# Patient Record
Sex: FEMALE | Race: WHITE | NOT HISPANIC OR LATINO | Employment: FULL TIME | URBAN - METROPOLITAN AREA
[De-identification: names, ages, dates, MRNs, and addresses within clinical notes are randomized per-mention and may not be internally consistent; named-entity substitution may affect disease eponyms.]

---

## 2018-05-17 ENCOUNTER — HISTORICAL (OUTPATIENT)
Dept: LAB | Facility: HOSPITAL | Age: 28
End: 2018-05-17

## 2018-05-17 LAB
APPEARANCE, UA: ABNORMAL
BACTERIA SPEC CULT: ABNORMAL /HPF
BILIRUB UR QL STRIP: NEGATIVE
COLOR UR: YELLOW
GLUCOSE (UA): NEGATIVE
HGB UR QL STRIP: ABNORMAL
KETONES UR QL STRIP: NEGATIVE
LEUKOCYTE ESTERASE UR QL STRIP: ABNORMAL
NITRITE UR QL STRIP: NEGATIVE
PH UR STRIP: 6.5 [PH] (ref 5–9)
PROT UR QL STRIP: ABNORMAL
RBC #/AREA URNS HPF: 6 /HPF (ref 0–2)
SP GR UR STRIP: 1.01 (ref 1–1.03)
SQUAMOUS EPITHELIAL, UA: >36 /HPF (ref 0–4)
UROBILINOGEN UR STRIP-ACNC: 0.2
WBC #/AREA URNS HPF: 96 /HPF (ref 0–3)

## 2018-05-19 LAB — FINAL CULTURE: NORMAL

## 2022-04-10 ENCOUNTER — HISTORICAL (OUTPATIENT)
Dept: ADMINISTRATIVE | Facility: HOSPITAL | Age: 32
End: 2022-04-10
Payer: COMMERCIAL

## 2022-04-25 VITALS
OXYGEN SATURATION: 98 % | HEIGHT: 62 IN | BODY MASS INDEX: 44.63 KG/M2 | DIASTOLIC BLOOD PRESSURE: 93 MMHG | SYSTOLIC BLOOD PRESSURE: 145 MMHG | WEIGHT: 242.5 LBS

## 2022-05-20 ENCOUNTER — OFFICE VISIT (OUTPATIENT)
Dept: GYNECOLOGY | Facility: CLINIC | Age: 32
End: 2022-05-20
Payer: COMMERCIAL

## 2022-05-20 VITALS
HEART RATE: 98 BPM | DIASTOLIC BLOOD PRESSURE: 84 MMHG | TEMPERATURE: 98 F | SYSTOLIC BLOOD PRESSURE: 138 MMHG | BODY MASS INDEX: 44.87 KG/M2 | WEIGHT: 243.81 LBS | HEIGHT: 62 IN | RESPIRATION RATE: 18 BRPM

## 2022-05-20 DIAGNOSIS — Z01.419 ROUTINE GYNECOLOGICAL EXAMINATION: Primary | ICD-10-CM

## 2022-05-20 PROCEDURE — 99214 OFFICE O/P EST MOD 30 MIN: CPT | Mod: PBBFAC | Performed by: OBSTETRICS & GYNECOLOGY

## 2022-05-20 PROCEDURE — 87624 HPV HI-RISK TYP POOLED RSLT: CPT | Performed by: OBSTETRICS & GYNECOLOGY

## 2022-05-20 RX ORDER — OXYCODONE AND ACETAMINOPHEN 10; 325 MG/1; MG/1
1 TABLET ORAL 3 TIMES DAILY PRN
COMMUNITY
Start: 2022-04-18 | End: 2023-01-23

## 2022-05-20 RX ORDER — LURASIDONE HYDROCHLORIDE 20 MG/1
1 TABLET, FILM COATED ORAL NIGHTLY
COMMUNITY
Start: 2022-05-11 | End: 2023-01-23

## 2022-05-20 RX ORDER — FLUOXETINE HYDROCHLORIDE 40 MG/1
1 CAPSULE ORAL EVERY MORNING
COMMUNITY
Start: 2022-05-11

## 2022-05-20 RX ORDER — ASPIRIN 325 MG
1 TABLET, DELAYED RELEASE (ENTERIC COATED) ORAL
COMMUNITY
Start: 2022-03-08 | End: 2023-01-23

## 2022-05-20 RX ORDER — CLONAZEPAM 0.5 MG/1
1 TABLET ORAL 2 TIMES DAILY PRN
COMMUNITY
Start: 2022-05-11

## 2022-05-20 NOTE — PROGRESS NOTES
Subjective:       Patient ID: Va Guzman is a 32 y.o. female.    Chief Complaint:  Annual Exam      History of Present Illness  Presents for gyn wellness visit without complaints.  Periods are regular without excessive bleeding or pain. She denies abnormal bleeding, vaginal discharge, breast masses or other changes of concern.      GYN & OB History  Patient's last menstrual period was 2022.   Date of Last Pap: No result found    OB History    Para Term  AB Living   2 2 2     2   SAB IAB Ectopic Multiple Live Births                  # Outcome Date GA Lbr Abimael/2nd Weight Sex Delivery Anes PTL Lv   2 Term 17     Vag-Spont      1 Term 14     Vag-Spont          Review of Systems  Review of Systems        Objective:    Physical Exam:   Constitutional: She is oriented to person, place, and time. She appears well-developed and well-nourished.    HENT:   Head: Normocephalic.    Eyes: Pupils are equal, round, and reactive to light. EOM are normal.    Neck: No thyromegaly present.    Cardiovascular: Normal rate, regular rhythm and normal heart sounds.    No murmur heard.   Pulmonary/Chest: Effort normal and breath sounds normal. Right breast exhibits no mass, no nipple discharge, no skin change and no tenderness. Left breast exhibits no mass, no nipple discharge, no skin change and no tenderness. Breasts are symmetrical.        Abdominal: Soft. She exhibits no distension. There is no abdominal tenderness.     Genitourinary:    Vagina and uterus normal.   The external female genitalia was normal.   Labial bartholins normal.There is no lesion on the right labia. There is no lesion on the left labia. Cervix is normal. Right adnexum displays no mass and no tenderness. Left adnexum displays no mass and no tenderness. Vagina exhibits no lesion. No erythema, rectocele, cystocele or unspecified prolapse of vaginal walls in the vagina. Cervix exhibits no lesion, no discharge, no friability, no  lesion and no tenderness. Uerus contour normal  Uterus is not fixed. Normal urethral meatus.Bladder findings: no bladder tenderness          Musculoskeletal: Normal range of motion. No edema.      Lymphadenopathy:     She has no cervical adenopathy.    Neurological: She is alert and oriented to person, place, and time.    Skin: Skin is warm and dry. No rash noted.    Psychiatric: She has a normal mood and affect.          Assessment:        1. Routine gynecological examination                Plan:      F/U Pap  SBE

## 2022-05-30 LAB
HPV16+18+H RISK 12 DNA CVX-IMP: NEGATIVE
INSULIN SERPL-ACNC: NORMAL U[IU]/ML
LAB AP BETHESDA CATEGORY: NORMAL
LAB AP COMMENTS: NORMAL
LAB AP GYN MOLECULAR TESTING: NORMAL
LAB AP LMP DATE: NORMAL
LAB AP OCHS PAP SPECIMEN ADEQUACY: NORMAL
LAB AP OHS PAP INTERPRETATION: NORMAL
LAB AP PAP DISCLAIMER COMMENTS: NORMAL

## 2022-11-08 ENCOUNTER — TELEPHONE (OUTPATIENT)
Dept: PRIMARY CARE CLINIC | Facility: CLINIC | Age: 32
End: 2022-11-08
Payer: COMMERCIAL

## 2022-11-08 ENCOUNTER — PATIENT MESSAGE (OUTPATIENT)
Dept: PRIMARY CARE CLINIC | Facility: CLINIC | Age: 32
End: 2022-11-08
Payer: COMMERCIAL

## 2022-11-08 NOTE — TELEPHONE ENCOUNTER
----- Message from Yu William sent at 11/8/2022 10:09 AM CST -----  Regarding: sooner appt  .Type:  Needs Medical Advice    Who Called: pt   Symptoms (please be specific):    How long has patient had these symptoms:    Pharmacy name and phone #:    Would the patient rather a call back or a response via [facility]? Call back   Best Call Back Number: [phone]  Additional Information: pt wants a sooner appt than January 4th. She wants to discuss weight loss with Dr. Muir

## 2022-11-14 ENCOUNTER — OFFICE VISIT (OUTPATIENT)
Dept: URGENT CARE | Facility: CLINIC | Age: 32
End: 2022-11-14
Payer: COMMERCIAL

## 2022-11-14 VITALS
DIASTOLIC BLOOD PRESSURE: 93 MMHG | SYSTOLIC BLOOD PRESSURE: 139 MMHG | WEIGHT: 244 LBS | BODY MASS INDEX: 43.23 KG/M2 | HEART RATE: 96 BPM | RESPIRATION RATE: 20 BRPM | OXYGEN SATURATION: 99 % | HEIGHT: 63 IN | TEMPERATURE: 99 F

## 2022-11-14 DIAGNOSIS — J11.1 INFLUENZA: ICD-10-CM

## 2022-11-14 DIAGNOSIS — J02.0 STREP THROAT: Primary | ICD-10-CM

## 2022-11-14 DIAGNOSIS — J02.9 SORE THROAT: ICD-10-CM

## 2022-11-14 LAB
CTP QC/QA: YES
CTP QC/QA: YES
FLUAV AG NPH QL: POSITIVE
FLUBV AG NPH QL: NEGATIVE
S PYO RRNA THROAT QL PROBE: POSITIVE

## 2022-11-14 PROCEDURE — 87804 POCT INFLUENZA A/B: ICD-10-PCS | Mod: QW,,, | Performed by: FAMILY MEDICINE

## 2022-11-14 PROCEDURE — 87804 INFLUENZA ASSAY W/OPTIC: CPT | Mod: QW,,, | Performed by: FAMILY MEDICINE

## 2022-11-14 PROCEDURE — 87880 STREP A ASSAY W/OPTIC: CPT | Mod: QW,,, | Performed by: FAMILY MEDICINE

## 2022-11-14 PROCEDURE — 87880 POCT RAPID STREP A: ICD-10-PCS | Mod: QW,,, | Performed by: FAMILY MEDICINE

## 2022-11-14 PROCEDURE — 99213 OFFICE O/P EST LOW 20 MIN: CPT | Mod: 25,,, | Performed by: FAMILY MEDICINE

## 2022-11-14 PROCEDURE — 99213 PR OFFICE/OUTPT VISIT, EST, LEVL III, 20-29 MIN: ICD-10-PCS | Mod: 25,,, | Performed by: FAMILY MEDICINE

## 2022-11-14 RX ORDER — AMOXICILLIN 500 MG/1
500 CAPSULE ORAL EVERY 12 HOURS
Qty: 20 CAPSULE | Refills: 0 | Status: SHIPPED | OUTPATIENT
Start: 2022-11-14 | End: 2022-11-24

## 2022-11-14 RX ORDER — OSELTAMIVIR PHOSPHATE 75 MG/1
75 CAPSULE ORAL 2 TIMES DAILY
Qty: 10 CAPSULE | Refills: 0 | Status: SHIPPED | OUTPATIENT
Start: 2022-11-14 | End: 2022-11-19

## 2022-11-14 RX ORDER — DEXTROAMPHETAMINE SACCHARATE, AMPHETAMINE ASPARTATE, DEXTROAMPHETAMINE SULFATE AND AMPHETAMINE SULFATE 2.5; 2.5; 2.5; 2.5 MG/1; MG/1; MG/1; MG/1
1 TABLET ORAL 2 TIMES DAILY
COMMUNITY
Start: 2022-10-13

## 2022-11-14 RX ORDER — AMOXICILLIN 500 MG/1
500 CAPSULE ORAL EVERY 12 HOURS
Qty: 20 CAPSULE | Refills: 0 | Status: SHIPPED | OUTPATIENT
Start: 2022-11-14 | End: 2022-11-14

## 2022-11-14 RX ORDER — BALOXAVIR MARBOXIL 80 MG/1
80 TABLET, FILM COATED ORAL ONCE
Qty: 1 TABLET | Refills: 0 | Status: SHIPPED | OUTPATIENT
Start: 2022-11-14 | End: 2022-11-14

## 2022-11-14 NOTE — PATIENT INSTRUCTIONS
Plan:       Strep positive, flu positive  Prescriptions printed  Prescriptions printed  Increase fluid intake. Monitor for fever. Take tylenol/acetaminophen as needed for headache, bodyaches or fever.   Treat your symptoms like the common cold, take Delysm/dimetapp/robitussin as needed for cough, claritin, flonase, mucinex for congestion, for example.   Complications for flu include pneumonia, bronchitis, and sinusitis.   Stay home for 5 to 7 days total starting from when your symptoms began.  If your symptoms worsen, or you develop shortness of breath, worsening of cough, or fever over 102.5, seek medical attention immediately.     Do not share any food cups drinks or utensils with anybody.  Change your toothbrush after 2 days of antibiotics

## 2022-11-14 NOTE — PROGRESS NOTES
"Subjective:       Patient ID: Va Guzman is a 32 y.o. female.    Vitals:  height is 5' 3" (1.6 m) and weight is 110.7 kg (244 lb). Her oral temperature is 99.1 °F (37.3 °C). Her blood pressure is 139/93 (abnormal) and her pulse is 96. Her respiration is 20 and oxygen saturation is 99%.     Chief Complaint: Sore Throat (Sore throat, fatigue, HA, exposure to strep )    HPI  ROS    Objective:      Physical Exam      Assessment:       1. Strep throat    2. Sore throat            Plan:         Strep throat    Sore throat  -     POCT rapid strep A  -     POCT Influenza A/B    Other orders  -     amoxicillin (AMOXIL) 500 MG capsule; Take 1 capsule (500 mg total) by mouth every 12 (twelve) hours. for 10 days  Dispense: 20 capsule; Refill: 0                     "

## 2022-11-14 NOTE — PROGRESS NOTES
"Subjective:       Patient ID: Va Guzman is a 32 y.o. female.    Vitals:  height is 5' 3" (1.6 m) and weight is 110.7 kg (244 lb). Her oral temperature is 99.1 °F (37.3 °C). Her blood pressure is 139/93 (abnormal) and her pulse is 96. Her respiration is 20 and oxygen saturation is 99%.     Chief Complaint: Sore Throat (Sore throat, fatigue, HA, exposure to strep )    HPI  ROS    Objective:      Physical Exam      Assessment:       1. Strep throat    2. Sore throat    3. Influenza            Plan:       Strep positive, flu positive  Prescriptions printed  Prescriptions printed  Increase fluid intake. Monitor for fever. Take tylenol/acetaminophen as needed for headache, bodyaches or fever.   Treat your symptoms like the common cold, take Delysm/dimetapp/robitussin as needed for cough, claritin, flonase, mucinex for congestion, for example.   Complications for flu include pneumonia, bronchitis, and sinusitis.   Stay home for 5 to 7 days total starting from when your symptoms began.  If your symptoms worsen, or you develop shortness of breath, worsening of cough, or fever over 102.5, seek medical attention immediately.     Do not share any food cups drinks or utensils with anybody.  Change your toothbrush after 2 days of antibiotics      Strep throat    Sore throat  -     POCT rapid strep A  -     POCT Influenza A/B    Influenza    Other orders  -     amoxicillin (AMOXIL) 500 MG capsule; Take 1 capsule (500 mg total) by mouth every 12 (twelve) hours. for 10 days  Dispense: 20 capsule; Refill: 0                   "

## 2022-12-07 ENCOUNTER — TELEPHONE (OUTPATIENT)
Dept: PRIMARY CARE CLINIC | Facility: CLINIC | Age: 32
End: 2022-12-07
Payer: COMMERCIAL

## 2022-12-07 NOTE — TELEPHONE ENCOUNTER
----- Message from Ada Stallings sent at 12/7/2022 10:51 AM CST -----  Regarding: advice  Pt called to get appt back that was accidentally canceled. That appt wasn't there anymore so she was scheduled for the 15th instead of the 14. She stated that she will be out of town the 15 and needs the appt for the 14th back if that's possible  [phone]

## 2023-01-22 PROBLEM — E66.01 MORBID OBESITY: Status: ACTIVE | Noted: 2023-01-22

## 2023-01-22 PROBLEM — E66.01 MORBID OBESITY: Chronic | Status: ACTIVE | Noted: 2023-01-22

## 2023-01-22 PROBLEM — F98.8 ATTENTION DEFICIT DISORDER (ADD) WITHOUT HYPERACTIVITY: Status: ACTIVE | Noted: 2023-01-22

## 2023-01-22 PROBLEM — F41.1 GAD (GENERALIZED ANXIETY DISORDER): Status: ACTIVE | Noted: 2023-01-22

## 2023-01-22 PROBLEM — F41.9 ANXIETY: Status: RESOLVED | Noted: 2023-01-22 | Resolved: 2023-01-22

## 2023-01-22 PROBLEM — F41.9 ANXIETY: Status: ACTIVE | Noted: 2023-01-22

## 2023-01-22 NOTE — PROGRESS NOTES
"Subjective:       Patient ID: Va Guzman is a 32 y.o. female.    Chief Complaint: Medication Management (Weight loss medications)    Previously established patient, has not been seen since December 2020, presents to discuss weight loss management.  She would like to try the Ozempic.  She does have PCOS, no prior diagnosis of diabetes.    Review of Systems   Constitutional: Negative.  Negative for fatigue and fever.   HENT: Negative.  Negative for sore throat and trouble swallowing.    Eyes: Negative.  Negative for redness and visual disturbance.   Respiratory: Negative.  Negative for chest tightness and shortness of breath.    Cardiovascular: Negative.  Negative for chest pain.   Gastrointestinal: Negative.  Negative for abdominal pain, blood in stool and nausea.   Endocrine: Negative.  Negative for cold intolerance, heat intolerance and polyuria.   Genitourinary: Negative.    Musculoskeletal: Negative.  Negative for arthralgias, gait problem and joint swelling.   Integumentary:  Negative for rash. Negative.   Neurological: Negative.  Negative for dizziness, weakness and headaches.   Psychiatric/Behavioral: Negative.  Negative for agitation and dysphoric mood.        Objective:     Vitals:    01/23/23 1028   BP: 124/83   Pulse: 73   Resp: 18   SpO2: 100%   Weight: 109.8 kg (242 lb)   Height: 5' 3" (1.6 m)   Body mass index is 42.87 kg/m².     Physical Exam  Constitutional:       Appearance: Normal appearance.   Eyes:      Conjunctiva/sclera: Conjunctivae normal.   Cardiovascular:      Rate and Rhythm: Normal rate and regular rhythm.   Pulmonary:      Effort: Pulmonary effort is normal.      Breath sounds: Normal breath sounds.   Skin:     General: Skin is warm and dry.   Neurological:      Mental Status: She is alert.   Psychiatric:         Mood and Affect: Mood normal.         Behavior: Behavior normal.         Assessment:     Problem List Items Addressed This Visit          Endocrine    Morbid obesity - " Primary (Chronic)    Current Assessment & Plan     Weight loss, healthy dietary choices, increased activity/exercise are recommended.  To lose weight, it is generally recommended to restrict calories to approximately 1500 calories per day to lose 1 lb per week, and approximately 1200 calories per day to lose up to 2 lb per week.  Generally, this is a healthy amount of weight to lose, and an appropriate amount of time to lose this amount of weight.  Adding exercise will assist in losing weight, particularly aerobic exercise such as walking.  Keep track of BMI (body mass index), below 25 is considered normal, over 25 but below 30 is considered overweight, anything over 30 is considered moderately obese, over 35 severely obese, and over 40 is characterized as morbidly obese.    If weight loss medication is prescribed, it will be necessary that you begin a weight loss management plan as outlined below:  - weight loss treatment plan will only be instituted for patients that meet the criteria of obesity, i.e. BMI equal to or above 30.  - it will be necessary that you follow-up with an in-person visit every three months .  - based on a medical evaluation, medication may be prescribed.  Medication will be refilled for three months at a time, and will be given only at each visit, (medication will not be refilled via phone call request).  - it will be necessary that a 5% weight loss be documented at each three months visit for continuation of the weight loss plan.  Weight loss of less than 5% may mean discontinuation of weight loss plan, including discontinuation of medication.         Relevant Medications    semaglutide (OZEMPIC) 0.25 mg or 0.5 mg(2 mg/1.5 mL) pen injector    Encounter for weight loss counseling (Chronic)    Current Assessment & Plan     Start Ozempic 0.25 mg per injection weekly, may increase to 0.5 mg per injection after six weeks if no side effects or problems.         Relevant Medications    semaglutide  (OZEMPIC) 0.25 mg or 0.5 mg(2 mg/1.5 mL) pen injector    Other Relevant Orders    CBC Auto Differential    Comprehensive Metabolic Panel    Hemoglobin A1C    TSH          Medication List with Changes/Refills   New Medications    SEMAGLUTIDE (OZEMPIC) 0.25 MG OR 0.5 MG(2 MG/1.5 ML) PEN INJECTOR    Inject 0.25 mg into the skin every 7 days.   Current Medications    CHOLECALCIFEROL, VITAMIN D3, 1,250 MCG (50,000 UNIT) CAPSULE    Take 1 capsule by mouth every 7 days.    CLONAZEPAM (KLONOPIN) 0.5 MG TABLET    Take 1 tablet by mouth 2 (two) times daily as needed.    DEXTROAMPHETAMINE-AMPHETAMINE 10 MG TAB    Take 1 tablet by mouth 2 (two) times daily.    FLUOXETINE 40 MG CAPSULE    Take 1 capsule by mouth every morning.   Discontinued Medications    LATUDA 20 MG TAB TABLET    Take 1 tablet by mouth every evening.    OXYCODONE-ACETAMINOPHEN (PERCOCET)  MG PER TABLET    Take 1 tablet by mouth 3 (three) times daily as needed.     Plan:             Follow up in about 13 weeks (around 4/24/2023).

## 2023-01-22 NOTE — ASSESSMENT & PLAN NOTE
Weight loss, healthy dietary choices, increased activity/exercise are recommended.  To lose weight, it is generally recommended to restrict calories to approximately 1500 calories per day to lose 1 lb per week, and approximately 1200 calories per day to lose up to 2 lb per week.  Generally, this is a healthy amount of weight to lose, and an appropriate amount of time to lose this amount of weight.  Adding exercise will assist in losing weight, particularly aerobic exercise such as walking.  Keep track of BMI (body mass index), below 25 is considered normal, over 25 but below 30 is considered overweight, anything over 30 is considered moderately obese, over 35 severely obese, and over 40 is characterized as morbidly obese.    If weight loss medication is prescribed, it will be necessary that you begin a weight loss management plan as outlined below:  - weight loss treatment plan will only be instituted for patients that meet the criteria of obesity, i.e. BMI equal to or above 30.  - it will be necessary that you follow-up with an in-person visit every three months .  - based on a medical evaluation, medication may be prescribed.  Medication will be refilled for three months at a time, and will be given only at each visit, (medication will not be refilled via phone call request).  - it will be necessary that a 5% weight loss be documented at each three months visit for continuation of the weight loss plan.  Weight loss of less than 5% may mean discontinuation of weight loss plan, including discontinuation of medication.

## 2023-01-23 ENCOUNTER — OFFICE VISIT (OUTPATIENT)
Dept: PRIMARY CARE CLINIC | Facility: CLINIC | Age: 33
End: 2023-01-23
Payer: COMMERCIAL

## 2023-01-23 ENCOUNTER — PATIENT MESSAGE (OUTPATIENT)
Dept: PRIMARY CARE CLINIC | Facility: CLINIC | Age: 33
End: 2023-01-23

## 2023-01-23 VITALS
OXYGEN SATURATION: 100 % | BODY MASS INDEX: 42.88 KG/M2 | WEIGHT: 242 LBS | RESPIRATION RATE: 18 BRPM | HEART RATE: 73 BPM | SYSTOLIC BLOOD PRESSURE: 124 MMHG | DIASTOLIC BLOOD PRESSURE: 83 MMHG | HEIGHT: 63 IN

## 2023-01-23 DIAGNOSIS — E66.01 MORBID OBESITY: Primary | Chronic | ICD-10-CM

## 2023-01-23 DIAGNOSIS — Z71.3 ENCOUNTER FOR WEIGHT LOSS COUNSELING: ICD-10-CM

## 2023-01-23 PROBLEM — F98.8 ATTENTION DEFICIT DISORDER (ADD) WITHOUT HYPERACTIVITY: Chronic | Status: ACTIVE | Noted: 2023-01-22

## 2023-01-23 PROBLEM — F41.1 GAD (GENERALIZED ANXIETY DISORDER): Chronic | Status: ACTIVE | Noted: 2023-01-22

## 2023-01-23 PROCEDURE — 99214 PR OFFICE/OUTPT VISIT, EST, LEVL IV, 30-39 MIN: ICD-10-PCS | Mod: ,,, | Performed by: GENERAL PRACTICE

## 2023-01-23 PROCEDURE — 99214 OFFICE O/P EST MOD 30 MIN: CPT | Mod: ,,, | Performed by: GENERAL PRACTICE

## 2023-01-23 RX ORDER — SEMAGLUTIDE 1.34 MG/ML
0.25 INJECTION, SOLUTION SUBCUTANEOUS
Qty: 1 PEN | Refills: 1 | Status: SHIPPED | OUTPATIENT
Start: 2023-01-23 | End: 2023-02-15

## 2023-01-23 NOTE — ASSESSMENT & PLAN NOTE
Start Ozempic 0.25 mg per injection weekly, may increase to 0.5 mg per injection after six weeks if no side effects or problems.

## 2023-01-25 ENCOUNTER — PATIENT MESSAGE (OUTPATIENT)
Dept: PRIMARY CARE CLINIC | Facility: CLINIC | Age: 33
End: 2023-01-25
Payer: COMMERCIAL

## 2023-01-31 ENCOUNTER — TELEPHONE (OUTPATIENT)
Dept: PRIMARY CARE CLINIC | Facility: CLINIC | Age: 33
End: 2023-01-31
Payer: COMMERCIAL

## 2023-02-08 ENCOUNTER — PATIENT MESSAGE (OUTPATIENT)
Dept: PRIMARY CARE CLINIC | Facility: CLINIC | Age: 33
End: 2023-02-08
Payer: COMMERCIAL

## 2023-02-15 ENCOUNTER — OFFICE VISIT (OUTPATIENT)
Dept: PRIMARY CARE CLINIC | Facility: CLINIC | Age: 33
End: 2023-02-15
Payer: COMMERCIAL

## 2023-02-15 ENCOUNTER — PATIENT MESSAGE (OUTPATIENT)
Dept: PRIMARY CARE CLINIC | Facility: CLINIC | Age: 33
End: 2023-02-15

## 2023-02-15 DIAGNOSIS — E66.01 MORBID OBESITY: Primary | Chronic | ICD-10-CM

## 2023-02-15 PROCEDURE — 99212 OFFICE O/P EST SF 10 MIN: CPT | Mod: 95,,, | Performed by: GENERAL PRACTICE

## 2023-02-15 PROCEDURE — 99212 PR OFFICE/OUTPT VISIT, EST, LEVL II, 10-19 MIN: ICD-10-PCS | Mod: 95,,, | Performed by: GENERAL PRACTICE

## 2023-02-15 NOTE — ASSESSMENT & PLAN NOTE
The prescription should still be available.  I suggested that she try to go on the website and obtain a coupon.  If she needs for me to resend the prescription, she may contact the clinic.

## 2023-02-15 NOTE — PROGRESS NOTES
Subjective:       Patient ID: Va Guzman is a 33 y.o. female.    Chief Complaint: No chief complaint on file.    Established patient, would like to discuss weight loss medications.  I prescribed her Ozempic but she was not able to afford it.    I have reviewed the patient's name and date of birth.  I verbally reviewed the patient's past medical history, active medication list and allergy list.  I will also verify the patient's identity with a picture ID if necessary.  I have verified that the patient is in the [facility].  The patient has consented to be treated remotely by virtual appointment via [facility] approved interactive audiovisual format.  The patient has access to a working audiovisual format device.    Originating site (whether patient is located):  [facility]  Distant site (whether provider is located):  [facility]    Review of Systems   Constitutional:  Negative for activity change and unexpected weight change.   HENT:  Negative for hearing loss, rhinorrhea and trouble swallowing.    Eyes:  Negative for discharge and visual disturbance.   Respiratory:  Negative for chest tightness and wheezing.    Cardiovascular:  Negative for chest pain and palpitations.   Gastrointestinal:  Negative for blood in stool, constipation, diarrhea and vomiting.   Endocrine: Negative for polydipsia and polyuria.   Genitourinary:  Negative for difficulty urinating, dysuria, hematuria and menstrual problem.   Musculoskeletal:  Negative for arthralgias, joint swelling and neck pain.   Neurological:  Negative for weakness and headaches.   Psychiatric/Behavioral:  Negative for confusion and dysphoric mood.        Objective:   There were no vitals filed for this visit.There is no height or weight on file to calculate BMI.     Physical Exam  Constitutional:       Appearance: Normal appearance.   Psychiatric:         Mood and Affect: Mood normal.         Behavior: Behavior normal.         Thought  Content: Thought content normal.         Lab Results   Component Value Date     01/23/2023    K 3.7 01/23/2023    CO2 26 01/23/2023    BUN 8.6 01/23/2023    CREATININE 0.69 01/23/2023    CALCIUM 8.9 01/23/2023    ALBUMIN 3.7 01/23/2023    BILITOT 0.4 01/23/2023    ALKPHOS 83 01/23/2023    AST 17 01/23/2023    ALT 15 01/23/2023     Lab Results   Component Value Date    WBC 11.5 01/23/2023    RBC 5.34 01/23/2023    HGB 13.2 01/23/2023    HCT 41.3 01/23/2023    MCV 77.3 (L) 01/23/2023    RDW 15.7 01/23/2023     01/23/2023      Lab Results   Component Value Date    TSH 1.014 01/23/2023     Lab Results   Component Value Date    HGBA1C 4.8 01/23/2023          Assessment:     Problem List Items Addressed This Visit          Endocrine    Morbid obesity - Primary (Chronic)    Current Assessment & Plan     The prescription should still be available.  I suggested that she try to go on the website and obtain a coupon.  If she needs for me to resend the prescription, she may contact the clinic.               Medication List with Changes/Refills   Current Medications    CLONAZEPAM (KLONOPIN) 0.5 MG TABLET    Take 1 tablet by mouth 2 (two) times daily as needed.    DEXTROAMPHETAMINE-AMPHETAMINE 10 MG TAB    Take 1 tablet by mouth 2 (two) times daily.    FLUOXETINE 40 MG CAPSULE    Take 1 capsule by mouth every morning.   Discontinued Medications    SEMAGLUTIDE (OZEMPIC) 0.25 MG OR 0.5 MG(2 MG/1.5 ML) PEN INJECTOR    Inject 0.25 mg into the skin every 7 days.     Plan:             No follow-ups on file.

## 2023-02-19 ENCOUNTER — PATIENT MESSAGE (OUTPATIENT)
Dept: PRIMARY CARE CLINIC | Facility: CLINIC | Age: 33
End: 2023-02-19
Payer: COMMERCIAL

## 2023-02-22 DIAGNOSIS — E66.01 MORBID OBESITY: Primary | Chronic | ICD-10-CM

## 2023-02-22 RX ORDER — SEMAGLUTIDE 0.25 MG/.5ML
0.25 INJECTION, SOLUTION SUBCUTANEOUS
Qty: 1 ML | Refills: 5 | Status: SHIPPED | OUTPATIENT
Start: 2023-02-22 | End: 2023-06-23

## 2023-06-23 ENCOUNTER — OFFICE VISIT (OUTPATIENT)
Dept: GYNECOLOGY | Facility: CLINIC | Age: 33
End: 2023-06-23
Payer: COMMERCIAL

## 2023-06-23 VITALS
HEART RATE: 98 BPM | DIASTOLIC BLOOD PRESSURE: 88 MMHG | SYSTOLIC BLOOD PRESSURE: 131 MMHG | RESPIRATION RATE: 20 BRPM | HEIGHT: 63 IN | WEIGHT: 236 LBS | TEMPERATURE: 99 F | OXYGEN SATURATION: 99 % | BODY MASS INDEX: 41.82 KG/M2

## 2023-06-23 DIAGNOSIS — B96.89 BACTERIAL VAGINOSIS: ICD-10-CM

## 2023-06-23 DIAGNOSIS — Z01.419 ROUTINE GYNECOLOGICAL EXAMINATION: Primary | ICD-10-CM

## 2023-06-23 DIAGNOSIS — N76.0 BACTERIAL VAGINOSIS: ICD-10-CM

## 2023-06-23 PROCEDURE — 99214 OFFICE O/P EST MOD 30 MIN: CPT | Mod: PBBFAC | Performed by: OBSTETRICS & GYNECOLOGY

## 2023-06-23 PROCEDURE — 99395 PR PREVENTIVE VISIT,EST,18-39: ICD-10-PCS | Mod: S$PBB,,, | Performed by: OBSTETRICS & GYNECOLOGY

## 2023-06-23 PROCEDURE — 99395 PREV VISIT EST AGE 18-39: CPT | Mod: S$PBB,,, | Performed by: OBSTETRICS & GYNECOLOGY

## 2023-06-23 RX ORDER — METRONIDAZOLE 500 MG/1
500 TABLET ORAL EVERY 12 HOURS
Qty: 14 TABLET | Refills: 0 | Status: SHIPPED | OUTPATIENT
Start: 2023-06-23

## 2023-06-23 NOTE — PROGRESS NOTES
Subjective:       Patient ID: Va Guzman is a 33 y.o. female.    Chief Complaint:  annual     History of Present Illness:  Presents for gyn wellness visit with complaints of vaginal odor which she manages with OTC Lactobacillus supplement.  She is interested in metronidazole course.  Discussed clinical course of BV and possible recurrent nature as well as extended antibiotic regimens and boric acid for management.  Periods are regular without excessive bleeding or pain. She denies abnormal bleeding, vaginal discharge, breast masses or other changes of concern.     GYN & OB History  Patient's last menstrual period was 2023 (exact date).   Date of Last Pap: 2022    OB History    Para Term  AB Living   2 2 2     2   SAB IAB Ectopic Multiple Live Births                  # Outcome Date GA Lbr Abimael/2nd Weight Sex Delivery Anes PTL Lv   2 Term 17     Vag-Spont      1 Term 14     Vag-Spont          Past Medical History:   Diagnosis Date    Anxiety     Depression       Past Surgical History:   Procedure Laterality Date    BACK SURGERY  2022    FRACTURE SURGERY  2018    Bilateral tibia plateau fracture    Repair to Left Knee Left     SPINE SURGERY  2022    [place] cleanup    TONSILLECTOMY      Tonsils removed when a child        Current Outpatient Medications:     clonazePAM (KLONOPIN) 0.5 MG tablet, Take 1 tablet by mouth 2 (two) times daily as needed., Disp: , Rfl:     dextroamphetamine-amphetamine 10 mg Tab, Take 1 tablet by mouth 2 (two) times daily., Disp: , Rfl:     FLUoxetine 40 MG capsule, Take 1 capsule by mouth every morning., Disp: , Rfl:     metroNIDAZOLE (FLAGYL) 500 MG tablet, Take 1 tablet (500 mg total) by mouth every 12 (twelve) hours., Disp: 14 tablet, Rfl: 0     Review of patient's allergies indicates:  No Known Allergies     Social History     Socioeconomic History    Marital status:    Tobacco Use    Smoking status: Never    Smokeless  tobacco: Never   Substance and Sexual Activity    Alcohol use: Yes     Alcohol/week: 3.0 standard drinks     Types: 3 Drinks containing 0.5 oz of alcohol per week    Drug use: Not Currently    Sexual activity: Yes     Partners: Male     Birth control/protection: Partner-Vasectomy        Immunization History   Administered Date(s) Administered    COVID-19, MRNA, LN-S, PF (MODERNA FULL 0.5 ML DOSE) 08/17/2021, 09/29/2021    Influenza - Trivalent - MDCK - PF 11/23/2013        There are no preventive care reminders to display for this patient.     Review of Systems  Review of Systems       Objective:     Vitals:    06/23/23 0752   BP: 131/88   Pulse: 98   Resp: 20   Temp: 98.8 °F (37.1 °C)       Physical Exam:   Constitutional: She is oriented to person, place, and time. She appears well-developed and well-nourished.    HENT:   Head: Normocephalic.    Eyes: Pupils are equal, round, and reactive to light. EOM are normal.    Neck: No thyromegaly present.    Cardiovascular:  Normal rate, regular rhythm and normal heart sounds.            No murmur heard.   Pulmonary/Chest: Effort normal and breath sounds normal. Right breast exhibits no mass, no nipple discharge, no skin change and no tenderness. Left breast exhibits no mass, no nipple discharge, no skin change and no tenderness. Breasts are symmetrical.        Abdominal: Soft. She exhibits no distension. There is no abdominal tenderness.     Genitourinary:    Vagina and uterus normal.   The external female genitalia was normal.   Labial bartholins normal.There is no lesion on the right labia. There is no lesion on the left labia. Cervix is normal. Right adnexum displays no mass and no tenderness. Left adnexum displays no mass and no tenderness. Vagina exhibits no lesion. No erythema, rectocele, cystocele or unspecified prolapse of vaginal walls in the vagina. Cervix exhibits no lesion, no discharge, no friability, no lesion and no tenderness. Uerus contour normal  Uterus  is not fixed. Normal urethral meatus.Bladder findings: no bladder tenderness          Musculoskeletal: Normal range of motion. No edema.      Lymphadenopathy:     She has no cervical adenopathy.    Neurological: She is alert and oriented to person, place, and time.    Skin: Skin is warm and dry. No rash noted.    Psychiatric: She has a normal mood and affect.        Assessment:        1. Routine gynecological examination    2. Bacterial vaginosis                Plan:      Problem List Items Addressed This Visit    None  Visit Diagnoses       Routine gynecological examination    -  Primary    Bacterial vaginosis        Relevant Medications    metroNIDAZOLE (FLAGYL) 500 MG tablet           Medications Ordered This Encounter   Medications    metroNIDAZOLE (FLAGYL) 500 MG tablet     Sig: Take 1 tablet (500 mg total) by mouth every 12 (twelve) hours.     Dispense:  14 tablet     Refill:  0      Follow up in 1 year (on 6/23/2024).   SBE

## 2024-05-28 ENCOUNTER — OFFICE VISIT (OUTPATIENT)
Dept: PRIMARY CARE CLINIC | Facility: CLINIC | Age: 34
End: 2024-05-28
Payer: COMMERCIAL

## 2024-05-28 VITALS
DIASTOLIC BLOOD PRESSURE: 78 MMHG | HEIGHT: 63 IN | SYSTOLIC BLOOD PRESSURE: 121 MMHG | BODY MASS INDEX: 36.96 KG/M2 | WEIGHT: 208.63 LBS | HEART RATE: 81 BPM

## 2024-05-28 DIAGNOSIS — E55.9 VITAMIN D DEFICIENCY: Primary | Chronic | ICD-10-CM

## 2024-05-28 LAB — 25(OH)D3+25(OH)D2 SERPL-MCNC: 24 NG/ML (ref 30–80)

## 2024-05-28 PROCEDURE — 36415 COLL VENOUS BLD VENIPUNCTURE: CPT | Mod: ,,, | Performed by: GENERAL PRACTICE

## 2024-05-28 PROCEDURE — 82306 VITAMIN D 25 HYDROXY: CPT | Performed by: GENERAL PRACTICE

## 2024-05-28 PROCEDURE — 99213 OFFICE O/P EST LOW 20 MIN: CPT | Mod: ,,, | Performed by: GENERAL PRACTICE

## 2024-05-28 PROCEDURE — 36415 COLL VENOUS BLD VENIPUNCTURE: CPT | Performed by: GENERAL PRACTICE

## 2024-05-28 NOTE — PROGRESS NOTES
"Subjective:       Patient ID: Va Guzman is a 34 y.o. female.    Chief Complaint: Fatigue    Established patient, LOV 02/15/2023.  She presents to the clinic today requesting a vitamin-D level.  She does have a history of vitamin-D deficiency, is given vitamin-D from another provider, needs to have a documented vitamin-D level.  No other issues or problems.      Review of Systems   Constitutional:  Negative for activity change and unexpected weight change.   HENT:  Negative for hearing loss, rhinorrhea and trouble swallowing.    Eyes:  Negative for discharge and visual disturbance.   Respiratory:  Negative for chest tightness and wheezing.    Cardiovascular:  Negative for chest pain and palpitations.   Gastrointestinal:  Negative for blood in stool, constipation, diarrhea and vomiting.   Endocrine: Negative for polydipsia and polyuria.   Genitourinary:  Negative for difficulty urinating, dysuria, hematuria and menstrual problem.   Musculoskeletal:  Negative for arthralgias, joint swelling and neck pain.   Neurological:  Negative for weakness and headaches.   Psychiatric/Behavioral:  Negative for confusion and dysphoric mood.            Patient Care Team:  Renzo Muir MD as PCP - General (Family Medicine)  Jovany Trujillo MD as Consulting Physician (Gynecology)  Rosio Georges NP (Nurse Practitioner)  Joan Ahumada DO (Family Medicine)  Objective:     Vitals:    05/28/24 1504   BP: 121/78   Pulse: 81   Weight: 94.6 kg (208 lb 9.6 oz)   Height: 5' 3" (1.6 m)   Body mass index is 36.95 kg/m².     Physical Exam  Constitutional:       Appearance: She is obese.   HENT:      Head: Normocephalic.      Mouth/Throat:      Mouth: Mucous membranes are moist.      Pharynx: Oropharynx is clear.   Eyes:      Pupils: Pupils are equal, round, and reactive to light.   Cardiovascular:      Rate and Rhythm: Normal rate and regular rhythm.   Pulmonary:      Effort: Pulmonary effort is normal.      Breath sounds: " Normal breath sounds.   Abdominal:      Palpations: Abdomen is soft.   Musculoskeletal:         General: Normal range of motion.   Skin:     General: Skin is warm and dry.   Neurological:      General: No focal deficit present.      Mental Status: She is alert.   Psychiatric:         Mood and Affect: Mood normal.         Behavior: Behavior normal.         Thought Content: Thought content normal.         Judgment: Judgment normal.           Assessment:       ICD-10-CM ICD-9-CM   1. Vitamin D deficiency  E55.9 268.9       Current Outpatient Medications   Medication Instructions    clonazePAM (KLONOPIN) 0.5 MG tablet 1 tablet, Oral, 2 times daily PRN    dextroamphetamine-amphetamine 10 mg Tab 1 tablet, Oral, 2 times daily    FLUoxetine 40 MG capsule 1 capsule, Oral, Every morning    metroNIDAZOLE (FLAGYL) 500 mg, Oral, Every 12 hours     Plan:     Problem List Items Addressed This Visit          Endocrine    Vitamin D deficiency - Primary (Chronic)    Relevant Orders    Vitamin D               Follow up for next appointment as scheduled or as needed.

## 2024-05-28 NOTE — PROGRESS NOTES
Patient verified name and .Patient here for nurse visit to draw Vitamin D labs with (22) gauge needle. Patient was drawn in left antecubital .No complications or issues occurred. Patient tolerated venipuncture well. Patient expressed no questions or concerns.

## 2024-07-11 ENCOUNTER — OFFICE VISIT (OUTPATIENT)
Dept: GYNECOLOGY | Facility: CLINIC | Age: 34
End: 2024-07-11
Payer: COMMERCIAL

## 2024-07-11 VITALS
TEMPERATURE: 98 F | DIASTOLIC BLOOD PRESSURE: 86 MMHG | WEIGHT: 203 LBS | HEIGHT: 63 IN | BODY MASS INDEX: 35.97 KG/M2 | RESPIRATION RATE: 18 BRPM | HEART RATE: 90 BPM | SYSTOLIC BLOOD PRESSURE: 135 MMHG | OXYGEN SATURATION: 100 %

## 2024-07-11 DIAGNOSIS — Z01.419 ROUTINE GYNECOLOGICAL EXAMINATION: Primary | ICD-10-CM

## 2024-07-11 DIAGNOSIS — N39.3 SUI (STRESS URINARY INCONTINENCE, FEMALE): ICD-10-CM

## 2024-07-11 PROCEDURE — 99395 PREV VISIT EST AGE 18-39: CPT | Mod: S$PBB,,, | Performed by: OBSTETRICS & GYNECOLOGY

## 2024-07-11 PROCEDURE — 99213 OFFICE O/P EST LOW 20 MIN: CPT | Mod: PBBFAC | Performed by: OBSTETRICS & GYNECOLOGY

## 2024-07-11 RX ORDER — FLUOXETINE HYDROCHLORIDE 20 MG/1
20 CAPSULE ORAL EVERY MORNING
COMMUNITY
Start: 2024-07-03

## 2024-07-11 NOTE — PROGRESS NOTES
Subjective:       Patient ID: Va Guzman is a 34 y.o. female.    Chief Complaint:  Well Woman    History of Present Illness:  Presents for gyn wellness visit without complaints but reports TONY wit lingering cough post influenza in May/.  Periods are regular without excessive bleeding or pain. She denies intermenstrual bleeding, vaginal discharge, breast masses or other changes of concern.     GYN & OB History  Patient's last menstrual period was 2024 (exact date).   Date of Last Pap: 2022    OB History    Para Term  AB Living   2 2 2     2   SAB IAB Ectopic Multiple Live Births                  # Outcome Date GA Lbr Abimael/2nd Weight Sex Type Anes PTL Lv   2 Term 17     Vag-Spont      1 Term 12     Vag-Spont          Past Medical History:   Diagnosis Date    Anxiety     Depression       Past Surgical History:   Procedure Laterality Date    BACK SURGERY  2022    FRACTURE SURGERY  2018    Bilateral tibia plateau fracture    Repair to Left Knee Left     SPINE SURGERY  2022    [place] cleanup    TONSILLECTOMY      Tonsils removed when a child        Current Outpatient Medications:     clonazePAM (KLONOPIN) 0.5 MG tablet, Take 1 tablet by mouth 2 (two) times daily as needed., Disp: , Rfl:     dextroamphetamine-amphetamine 10 mg Tab, Take 1 tablet by mouth 2 (two) times daily., Disp: , Rfl:     FLUoxetine 20 MG capsule, Take 20 mg by mouth every morning., Disp: , Rfl:      Review of patient's allergies indicates:  No Known Allergies     Social History     Socioeconomic History    Marital status:    Tobacco Use    Smoking status: Never    Smokeless tobacco: Never   Substance and Sexual Activity    Alcohol use: Yes     Alcohol/week: 3.0 standard drinks of alcohol     Types: 3 Drinks containing 0.5 oz of alcohol per week    Drug use: Not Currently     Types: Marijuana     Comment: Anxiety prescription    Sexual activity: Yes     Partners: Male     Birth  control/protection: Partner-Vasectomy     Social Determinants of Health     Financial Resource Strain: Low Risk  (5/19/2024)    Overall Financial Resource Strain (CARDIA)     Difficulty of Paying Living Expenses: Not hard at all   Food Insecurity: No Food Insecurity (5/19/2024)    Hunger Vital Sign     Worried About Running Out of Food in the Last Year: Never true     Ran Out of Food in the Last Year: Never true   Physical Activity: Insufficiently Active (5/19/2024)    Exercise Vital Sign     Days of Exercise per Week: 4 days     Minutes of Exercise per Session: 30 min   Stress: Stress Concern Present (5/19/2024)    [employer] - Occupational Stress Questionnaire     Feeling of Stress : To some extent   Housing Stability: Unknown (5/19/2024)    Housing Stability Vital Sign     Unable to Pay for Housing in the Last Year: No        Immunization History   Administered Date(s) Administered    COVID-19, MRNA, LN-S, PF (MODERNA FULL 0.5 ML DOSE) 08/17/2021, 09/29/2021    Influenza - Trivalent - MDCK - PF 11/23/2013        Health Maintenance Due   Topic Date Due    Tetanus Vaccine  Never done    COVID-19 Vaccine (3 - 2023-24 season) 09/01/2023        Review of Systems  Review of Systems       Objective:     Vitals:    07/11/24 1413   BP: 135/86   Pulse: 90   Resp: 18   Temp: 98.3 °F (36.8 °C)       Physical Exam:   Constitutional: She is oriented to person, place, and time. She appears well-developed and well-nourished.    HENT:   Head: Normocephalic.    Eyes: Pupils are equal, round, and reactive to light. EOM are normal.    Neck: No thyromegaly present.    Cardiovascular:  Normal rate, regular rhythm and normal heart sounds.            No murmur heard.   Pulmonary/Chest: Effort normal and breath sounds normal. Right breast exhibits no mass, no nipple discharge, no skin change and no tenderness. Left breast exhibits no mass, no nipple discharge, no skin change and no tenderness. Breasts are  symmetrical.        Abdominal: Soft. She exhibits no distension. There is no abdominal tenderness.     Genitourinary:    Vagina and uterus normal.   The external female genitalia was normal.     Labial bartholins normal.There is no lesion on the right labia. There is no lesion on the left labia. Cervix is normal. Right adnexum displays no mass and no tenderness. Left adnexum displays no mass and no tenderness. Vagina exhibits no lesion. No erythema, rectocele, cystocele or prolapse of vaginal walls in the vagina. Cervix exhibits no lesion, no discharge, no friability and no tenderness. Uerus contour normal  Uterus is not fixed. Normal urethral meatus.Bladder findings: no bladder tenderness          Musculoskeletal: Normal range of motion. No edema.      Lymphadenopathy:     She has no cervical adenopathy.    Neurological: She is alert and oriented to person, place, and time.    Skin: Skin is warm and dry. No rash noted.    Psychiatric: She has a normal mood and affect.          Assessment:        1. Routine gynecological examination    2. TONY (stress urinary incontinence, female)                Plan:      Problem List Items Addressed This Visit    None  Visit Diagnoses       Routine gynecological examination    -  Primary    TONY (stress urinary incontinence, female)        Kegel exercises.  Discussed surgical options, but symptoms should improve as cough resolves            Follow up in about 1 year (around 7/11/2025) for GYN Wellness.   SBE

## 2024-09-24 ENCOUNTER — PATIENT MESSAGE (OUTPATIENT)
Dept: GYNECOLOGY | Facility: CLINIC | Age: 34
End: 2024-09-24
Payer: COMMERCIAL

## 2025-08-19 ENCOUNTER — OFFICE VISIT (OUTPATIENT)
Dept: PRIMARY CARE CLINIC | Facility: CLINIC | Age: 35
End: 2025-08-19
Payer: COMMERCIAL

## 2025-08-19 VITALS
WEIGHT: 230 LBS | OXYGEN SATURATION: 100 % | DIASTOLIC BLOOD PRESSURE: 83 MMHG | BODY MASS INDEX: 40.75 KG/M2 | HEIGHT: 63 IN | SYSTOLIC BLOOD PRESSURE: 127 MMHG | HEART RATE: 80 BPM | RESPIRATION RATE: 18 BRPM

## 2025-08-19 DIAGNOSIS — E66.01 MORBID OBESITY: Chronic | ICD-10-CM

## 2025-08-19 DIAGNOSIS — E55.9 VITAMIN D DEFICIENCY: Chronic | ICD-10-CM

## 2025-08-19 DIAGNOSIS — Z00.00 WELLNESS EXAMINATION: Primary | ICD-10-CM

## 2025-08-19 DIAGNOSIS — M15.0 PRIMARY OSTEOARTHRITIS INVOLVING MULTIPLE JOINTS: Chronic | ICD-10-CM

## 2025-08-19 PROCEDURE — 99395 PREV VISIT EST AGE 18-39: CPT | Mod: ,,, | Performed by: GENERAL PRACTICE

## 2025-08-19 RX ORDER — RESVER/WINE/BFL/GRPSD/PC/C/POM 200MG-60MG
1 CAPSULE ORAL EVERY MORNING
COMMUNITY
Start: 2025-04-08

## 2025-08-19 RX ORDER — DICLOFENAC SODIUM 75 MG/1
75 TABLET, DELAYED RELEASE ORAL 2 TIMES DAILY PRN
COMMUNITY
End: 2025-08-19 | Stop reason: SDUPTHER

## 2025-08-19 RX ORDER — DICLOFENAC SODIUM 75 MG/1
75 TABLET, DELAYED RELEASE ORAL 2 TIMES DAILY PRN
Qty: 60 TABLET | Refills: 11 | Status: SHIPPED | OUTPATIENT
Start: 2025-08-19 | End: 2026-08-19

## 2025-08-19 RX ORDER — FLUOXETINE 10 MG/1
10 CAPSULE ORAL EVERY MORNING
COMMUNITY
Start: 2025-04-08